# Patient Record
Sex: FEMALE | Race: WHITE | NOT HISPANIC OR LATINO | URBAN - NONMETROPOLITAN AREA
[De-identification: names, ages, dates, MRNs, and addresses within clinical notes are randomized per-mention and may not be internally consistent; named-entity substitution may affect disease eponyms.]

---

## 2017-01-04 ENCOUNTER — OFFICE VISIT (OUTPATIENT)
Dept: CARDIOLOGY | Facility: CLINIC | Age: 82
End: 2017-01-04

## 2017-01-04 VITALS
HEART RATE: 69 BPM | DIASTOLIC BLOOD PRESSURE: 58 MMHG | BODY MASS INDEX: 23.73 KG/M2 | OXYGEN SATURATION: 97 % | WEIGHT: 139 LBS | SYSTOLIC BLOOD PRESSURE: 120 MMHG | HEIGHT: 64 IN

## 2017-01-04 DIAGNOSIS — I10 ESSENTIAL HYPERTENSION: Chronic | ICD-10-CM

## 2017-01-04 DIAGNOSIS — I42.0 NONISCHEMIC DILATED CARDIOMYOPATHY (HCC): Chronic | ICD-10-CM

## 2017-01-04 DIAGNOSIS — I50.22 CHRONIC SYSTOLIC CONGESTIVE HEART FAILURE (HCC): Primary | Chronic | ICD-10-CM

## 2017-01-04 DIAGNOSIS — Z95.0 PRESENCE OF CARDIAC PACEMAKER: Chronic | ICD-10-CM

## 2017-01-04 PROCEDURE — 99214 OFFICE O/P EST MOD 30 MIN: CPT | Performed by: NURSE PRACTITIONER

## 2017-01-04 RX ORDER — FUROSEMIDE 20 MG/1
20-40 TABLET ORAL DAILY
Qty: 60 TABLET | Refills: 11 | Status: SHIPPED | OUTPATIENT
Start: 2017-01-04 | End: 2018-03-23 | Stop reason: SDUPTHER

## 2017-01-04 NOTE — Clinical Note
"Copy of recent pacemaker \"episode\" from Dr. Brcok Garcia (EP) at [facility], [place], [place] for my review"

## 2017-01-04 NOTE — MR AVS SNAPSHOT
Tiffanie ROBLEDO Villa   1/4/2017 1:00 PM   Office Visit    Dept Phone:  [phone]   Encounter #:  [id number]    Provider:  TRAVIS Caraballo   Department:  [facility]                Your Full Care Plan              Today's Medication Changes          These changes are accurate as of: 1/4/17  1:41 PM.  If you have any questions, ask your nurse or doctor.               Medication(s)that have changed:     furosemide 20 MG tablet   Commonly known as:  LASIX   Take 1-2 tablets by mouth Daily.   What changed:  how much to take   Changed by:  TRAVIS Caraballo            Where to Get Your Medications      These medications were sent to [facility]/pharmacy #[id number] - [place], [place] - [address] AT CORNER OF [facility] - [phone]  - [phone] [address]  [address] [facility] [address]     Phone:  [phone]     furosemide 20 MG tablet                  Your Updated Medication List          This list is accurate as of: 1/4/17  1:41 PM.  Always use your most recent med list.                aspirin 81 MG EC tablet       cetirizine 10 MG tablet   Commonly known as:  zyrTEC       ENTRESTO 24-26 MG tablet   Generic drug:  sacubitril-valsartan       esomeprazole 40 MG capsule   Commonly known as:  nexIUM       furosemide 20 MG tablet   Commonly known as:  LASIX   Take 1-2 tablets by mouth Daily.       metoprolol succinate XL 50 MG 24 hr tablet   Commonly known as:  TOPROL-XL   1 1/2 tablets daily to equal 75 mg per day       predniSONE 10 MG tablet   Commonly known as:  DELTASONE       saccharomyces boulardii 250 MG capsule   Commonly known as:  FLORASTOR               We Performed the Following     Basic Metabolic Panel     Magnesium     Overnight Sleep Oximetry Study       You Were Diagnosed With        Codes Comments    Chronic systolic congestive heart failure    -  Primary ICD-10-CM: I50.22  ICD-9-CM: 428.22, 428.0 EF 35%, Class II-III, Stage C, will have her take extra  "diuretic the next few days to see if helps cough - to call Dr. Tran if sputum turns yellow or green    Nonischemic dilated cardiomyopathy     ICD-10-CM: I42.9  ICD-9-CM: 425.4 EF 35% per 5/2/2016 echo    Essential hypertension     ICD-10-CM: I10  ICD-9-CM: 401.9 controlled with HF medications    Presence of cardiac pacemaker     ICD-10-CM: Z95.0  ICD-9-CM: V45.01 BiV pacer, [employer], will review recent episode       Instructions     None    Patient Instructions History      Upcoming Appointments     Visit Type Date Time Department    FOLLOW UP 1/4/2017  1:00 PM [facility] HEART [facility] SCHULER    FOLLOW UP 2/7/2017 10:45 AM [facility] HEART [facility] Signup     Our records indicate that you have declined [facility] invit signup. If you would like to sign up for invit, please email [email or web address] or call [phone] to obtain an activation code.             Other Info from Your Visit           Your Appointments     Feb 07, 2017 10:45 AM CST   Follow Up with TRAVIS Caraballo   [facility] MEDICAL [facility] HEART GROUP (--)    [address] [place] [address] [address] [facility] [address] [phone]           Arrive 15 minutes prior to appointment.              Allergies     No Known Allergies      Reason for Visit     Congestive Heart Failure chronic systolic, EF 35% per 5/2/2016 echo, had an \"episode\" that was detected by her resynchronizing [employer] pacemaker on 12/25/2016, Dr. Garcia's office      Vital Signs     Blood Pressure Pulse Height Weight Oxygen Saturation Body Mass Index    120/58 (BP Location: Right arm, Patient Position: Sitting, Cuff Size: Adult) 69 64\" (162.6 cm) 139 lb (63 kg) 97% 23.86 kg/m2    Smoking Status                   Never Smoker           Problems and Diagnoses Noted     Heart failure    High blood pressure    Nonischemic dilated cardiomyopathy    Pacemaker        "

## 2017-01-04 NOTE — PROGRESS NOTES
"    Subjective:     Encounter Date:01/04/2017    Chief Complaint:    Patient ID: Tiffanie Driver is a 88 y.o. female here today for cardiac follow-up for heart failure. Doing well other than arthritis and a cold for the last month. Hasn't called Dr. Tran.    HPI     Congestive Heart Failure    Additional comments: chronic systolic, EF 35% per 5/2/2016 echo, had an \"episode\" that was detected by her resynchronizing [employer] pacemaker on 12/25/2016, Dr. Garcia's office       Last edited by TRAVIS Caraballo on 1/4/2017  1:29 PM. (History)        Congestive Heart Failure   Presents for follow-up visit. Associated symptoms include edema (a little, but not bad), nocturia (once or less), orthopnea (2 pillows), palpitations (rarely) and shortness of breath. Pertinent negatives include no abdominal pain, chest pain, claudication, near-syncope, paroxysmal nocturnal dyspnea (once or twice in the last 6 months) or unexpected weight change. The symptoms have been stable. Compliance problems include adherence to exercise and adherence to diet.  Compliance with diet is 26-50%. Compliance with exercise is 0-25%. Compliance with medications is %.           History:   Past Medical History   Diagnosis Date   • Anemia    • Cancer      SKIN   • Chest pain at rest    • CHF (congestive heart failure)    • Chronic pain syndrome    • Chronic systolic congestive heart failure    • Diabetes mellitus    • Essential hypertension    • Fatigue    • GERD (gastroesophageal reflux disease)    • Hypercalcemia    • Hyperlipidemia    • Hypertension    • Sick sinus syndrome 10/5/2012     Past Surgical History   Procedure Laterality Date   • Pacemaker implantation       [employer]   • Tonsillectomy     • Breast cyst excision       45 years ago     Social History     Social History   • Marital status: Unknown     Spouse name: N/A   • Number of children: N/A   • Years of education: N/A     Occupational History   • Not on file. "     Social History Main Topics   • Smoking status: Never Smoker   • Smokeless tobacco: Never Used   • Alcohol use No   • Drug use: No   • Sexual activity: Not on file     Other Topics Concern   • Not on file     Social History Narrative     Family History   Problem Relation Age of Onset   • Heart disease Mother    • Heart disease Father    • Hypertension Father        Outpatient Prescriptions Marked as Taking for the 1/4/17 encounter (Office Visit) with TRAVIS Caraballo   Medication Sig Dispense Refill   • aspirin 81 MG EC tablet 81 mg daily.     • cetirizine (ZyrTEC) 10 MG tablet Take 10 mg by mouth daily.     • ENTRESTO 24-26 MG tablet Take 24-26 mg by mouth 2 (two) times a day.  12   • esomeprazole (NexIUM) 40 MG capsule Take 40 mg by mouth Daily.  1   • furosemide (LASIX) 20 MG tablet Take 1-2 tablets by mouth Daily. 60 tablet 11   • metoprolol succinate XL (TOPROL-XL) 50 MG 24 hr tablet 1 1/2 tablets daily to equal 75 mg per day (Patient taking differently: Take 75 mg by mouth Daily. 1 1/2 tablets daily to equal 75 mg per day  ) 45 tablet 11   • predniSONE (DELTASONE) 10 MG tablet Take 5-10 mg by mouth Daily. Takes 1/2 to 1 tab daily     • saccharomyces boulardii (FLORASTOR) 250 MG capsule Take 250 mg by mouth daily.     • [DISCONTINUED] furosemide (LASIX) 20 MG tablet Take 20 mg by mouth daily.         Review of Systems:  Review of Systems   Constitution: Positive for decreased appetite and malaise/fatigue. Negative for chills, fever, unexpected weight change, weight gain and weight loss.   HENT: Positive for congestion (cold ). Negative for headaches, nosebleeds and sore throat.    Eyes: Negative for blurred vision and double vision.   Cardiovascular: Positive for irregular heartbeat (sometimes ), leg swelling (right ankle ) and palpitations (rarely). Negative for chest pain, claudication, near-syncope, orthopnea, paroxysmal nocturnal dyspnea and syncope.   Respiratory: Positive for cough (due to  "entresto), shortness of breath, sputum production (not yellow or green) and wheezing.    Endocrine: Negative for cold intolerance and heat intolerance.   Hematologic/Lymphatic: Negative for bleeding problem. Bruises/bleeds easily.   Skin: Positive for dry skin. Negative for itching and rash.   Musculoskeletal: Positive for arthritis, joint pain (hips and knees), neck pain and stiffness. Negative for back pain and muscle cramps.   Gastrointestinal: Positive for diarrhea (when drinking milk or eating yogurt), heartburn and nausea (today with cold ). Negative for abdominal pain, constipation, melena and vomiting.   Genitourinary: Positive for nocturia (once or less). Negative for hematuria.   Neurological: Positive for dizziness (sometimes ), light-headedness and loss of balance (when bending over ).   Psychiatric/Behavioral: Negative for depression. The patient has insomnia. The patient is not nervous/anxious.             Objective:     Visit Vitals   • /58 (BP Location: Right arm, Patient Position: Sitting, Cuff Size: Adult)   • Pulse 69   • Ht 64\" (162.6 cm)   • Wt 139 lb (63 kg)   • SpO2 97%   • BMI 23.86 kg/m2         Physical Exam   Constitutional: She is oriented to person, place, and time. Vital signs are normal. She appears well-developed and well-nourished.   Neck: No JVD present.   Cardiovascular: Normal rate and regular rhythm.  Exam reveals decreased pulses (BLEs).    Pulmonary/Chest: She has rales (fine) in the right lower field and the left lower field.   Musculoskeletal: She exhibits edema (trace non-pitting to BLEs).   Neurological: She is alert and oriented to person, place, and time. No cranial nerve deficit or sensory deficit.   Skin: Bruising noted. No cyanosis. Nails show no clubbing.   Vitals reviewed.      Lab/Diagnostics Review:     Procedures          Assessment/Plan:         Tiffanie was seen today for congestive heart failure.    Diagnoses and all orders for this visit:    Chronic " systolic congestive heart failure  Comments:  EF 35%, Class II-III, Stage C, will have her take extra diuretic the next few days to see if helps cough - to call Dr. Tran if sputum turns yellow or green  Orders:  -     Overnight Sleep Oximetry Study  -     Basic Metabolic Panel  -     Magnesium  -     furosemide (LASIX) 20 MG tablet; Take 1-2 tablets by mouth Daily.    Nonischemic dilated cardiomyopathy  Comments:  EF 35% per 5/2/2016 echo    Essential hypertension  Comments:  controlled with HF medications    Presence of cardiac pacemaker  Comments:  BiV pacer, [employer], will review recent episode       Return in about 1 month (around 2/4/2017) for Heart Failure Clinic.           Kerrie Whitley APRN, ACNP-BC, CHFN-BC

## 2017-01-04 NOTE — LETTER
"January 4, 2017     Chriss Tran MD  [address] [place] [id number]    Patient: Tiffanie Driver   YOB: 1928   Date of Visit: 1/4/2017       Dear Dr. Chelsea MD:    Tiffanie Driver was in my office today. Below is a copy of my note.    If you have questions, please do not hesitate to call me. I look forward to following Tiffanie along with you.         Sincerely,        TRAVIS Caraballo        CC: Brock Garcia III, MD        Subjective:     Encounter Date:01/04/2017    Chief Complaint:    Patient ID: Tiffanie Driver is a 88 y.o. female here today for cardiac follow-up for heart failure. Doing well other than arthritis and a cold for the last month. Hasn't called Dr. Tran.    HPI     Congestive Heart Failure    Additional comments: chronic systolic, EF 35% per 5/2/2016 echo, had an \"episode\" that was detected by her resynchronizing [employer] pacemaker on 12/25/2016, Dr. Garcia's office       Last edited by TRAVIS Caraballo on 1/4/2017  1:29 PM. (History)        Congestive Heart Failure   Presents for follow-up visit. Associated symptoms include edema (a little, but not bad), nocturia (once or less), orthopnea (2 pillows), palpitations (rarely) and shortness of breath. Pertinent negatives include no abdominal pain, chest pain, claudication, near-syncope, paroxysmal nocturnal dyspnea (once or twice in the last 6 months) or unexpected weight change. The symptoms have been stable. Compliance problems include adherence to exercise and adherence to diet.  Compliance with diet is 26-50%. Compliance with exercise is 0-25%. Compliance with medications is %.           History:   Past Medical History   Diagnosis Date   • Anemia    • Cancer      SKIN   • Chest pain at rest    • CHF (congestive heart failure)    • Chronic pain syndrome    • Chronic systolic congestive heart failure    • Diabetes mellitus    • Essential hypertension    • Fatigue    • GERD " (gastroesophageal reflux disease)    • Hypercalcemia    • Hyperlipidemia    • Hypertension    • Sick sinus syndrome 10/5/2012     Past Surgical History   Procedure Laterality Date   • Pacemaker implantation       [employer]   • Tonsillectomy     • Breast cyst excision       45 years ago     Social History     Social History   • Marital status: Unknown     Spouse name: N/A   • Number of children: N/A   • Years of education: N/A     Occupational History   • Not on file.     Social History Main Topics   • Smoking status: Never Smoker   • Smokeless tobacco: Never Used   • Alcohol use No   • Drug use: No   • Sexual activity: Not on file     Other Topics Concern   • Not on file     Social History Narrative     Family History   Problem Relation Age of Onset   • Heart disease Mother    • Heart disease Father    • Hypertension Father        Outpatient Prescriptions Marked as Taking for the 1/4/17 encounter (Office Visit) with TRAVIS Caraballo   Medication Sig Dispense Refill   • aspirin 81 MG EC tablet 81 mg daily.     • cetirizine (ZyrTEC) 10 MG tablet Take 10 mg by mouth daily.     • ENTRESTO 24-26 MG tablet Take 24-26 mg by mouth 2 (two) times a day.  12   • esomeprazole (NexIUM) 40 MG capsule Take 40 mg by mouth Daily.  1   • furosemide (LASIX) 20 MG tablet Take 1-2 tablets by mouth Daily. 60 tablet 11   • metoprolol succinate XL (TOPROL-XL) 50 MG 24 hr tablet 1 1/2 tablets daily to equal 75 mg per day (Patient taking differently: Take 75 mg by mouth Daily. 1 1/2 tablets daily to equal 75 mg per day  ) 45 tablet 11   • predniSONE (DELTASONE) 10 MG tablet Take 5-10 mg by mouth Daily. Takes 1/2 to 1 tab daily     • saccharomyces boulardii (FLORASTOR) 250 MG capsule Take 250 mg by mouth daily.     • [DISCONTINUED] furosemide (LASIX) 20 MG tablet Take 20 mg by mouth daily.         Review of Systems:  Review of Systems   Constitution: Positive for decreased appetite and malaise/fatigue. Negative for chills, fever,  "unexpected weight change, weight gain and weight loss.   HENT: Positive for congestion (cold ). Negative for headaches, nosebleeds and sore throat.    Eyes: Negative for blurred vision and double vision.   Cardiovascular: Positive for irregular heartbeat (sometimes ), leg swelling (right ankle ) and palpitations (rarely). Negative for chest pain, claudication, near-syncope, orthopnea, paroxysmal nocturnal dyspnea and syncope.   Respiratory: Positive for cough (due to entresto), shortness of breath, sputum production (not yellow or green) and wheezing.    Endocrine: Negative for cold intolerance and heat intolerance.   Hematologic/Lymphatic: Negative for bleeding problem. Bruises/bleeds easily.   Skin: Positive for dry skin. Negative for itching and rash.   Musculoskeletal: Positive for arthritis, joint pain (hips and knees), neck pain and stiffness. Negative for back pain and muscle cramps.   Gastrointestinal: Positive for diarrhea (when drinking milk or eating yogurt), heartburn and nausea (today with cold ). Negative for abdominal pain, constipation, melena and vomiting.   Genitourinary: Positive for nocturia (once or less). Negative for hematuria.   Neurological: Positive for dizziness (sometimes ), light-headedness and loss of balance (when bending over ).   Psychiatric/Behavioral: Negative for depression. The patient has insomnia. The patient is not nervous/anxious.             Objective:     Visit Vitals   • /58 (BP Location: Right arm, Patient Position: Sitting, Cuff Size: Adult)   • Pulse 69   • Ht 64\" (162.6 cm)   • Wt 139 lb (63 kg)   • SpO2 97%   • BMI 23.86 kg/m2         Physical Exam   Constitutional: She is oriented to person, place, and time. Vital signs are normal. She appears well-developed and well-nourished.   Neck: No JVD present.   Cardiovascular: Normal rate and regular rhythm.  Exam reveals decreased pulses (BLEs).    Pulmonary/Chest: She has rales (fine) in the right lower field and the " left lower field.   Musculoskeletal: She exhibits edema (trace non-pitting to BLEs).   Neurological: She is alert and oriented to person, place, and time. No cranial nerve deficit or sensory deficit.   Skin: Bruising noted. No cyanosis. Nails show no clubbing.   Vitals reviewed.      Lab/Diagnostics Review:     Procedures          Assessment/Plan:         Tiffanie was seen today for congestive heart failure.    Diagnoses and all orders for this visit:    Chronic systolic congestive heart failure  Comments:  EF 35%, Class II-III, Stage C, will have her take extra diuretic the next few days to see if helps cough - to call Dr. Tran if sputum turns yellow or green  Orders:  -     Overnight Sleep Oximetry Study  -     Basic Metabolic Panel  -     Magnesium  -     furosemide (LASIX) 20 MG tablet; Take 1-2 tablets by mouth Daily.    Nonischemic dilated cardiomyopathy  Comments:  EF 35% per 5/2/2016 echo    Essential hypertension  Comments:  controlled with HF medications    Presence of cardiac pacemaker  Comments:  BiV pacer, [employer], will review recent episode       Return in about 1 month (around 2/4/2017) for Heart Failure Clinic.           Kerrie Whitley, APRN, ACNP-BC, CHFN-BC

## 2017-02-15 ENCOUNTER — OFFICE VISIT (OUTPATIENT)
Dept: CARDIOLOGY | Facility: CLINIC | Age: 82
End: 2017-02-15

## 2017-02-15 VITALS
SYSTOLIC BLOOD PRESSURE: 118 MMHG | OXYGEN SATURATION: 97 % | HEART RATE: 83 BPM | BODY MASS INDEX: 23.9 KG/M2 | WEIGHT: 140 LBS | HEIGHT: 64 IN | DIASTOLIC BLOOD PRESSURE: 68 MMHG

## 2017-02-15 DIAGNOSIS — I10 ESSENTIAL HYPERTENSION: Chronic | ICD-10-CM

## 2017-02-15 DIAGNOSIS — I50.22 CHRONIC SYSTOLIC CONGESTIVE HEART FAILURE (HCC): Primary | Chronic | ICD-10-CM

## 2017-02-15 DIAGNOSIS — I42.0 NONISCHEMIC DILATED CARDIOMYOPATHY (HCC): Chronic | ICD-10-CM

## 2017-02-15 DIAGNOSIS — Z95.0 PRESENCE OF CARDIAC PACEMAKER: Chronic | ICD-10-CM

## 2017-02-15 PROCEDURE — 99214 OFFICE O/P EST MOD 30 MIN: CPT | Performed by: NURSE PRACTITIONER

## 2017-02-15 NOTE — PROGRESS NOTES
Subjective:     Encounter Date:02/15/2017    Chief Complaint:    Patient ID: Tiffanie Driver is a 88 y.o. female here today for cardiac follow-up.    HPI     Congestive Heart Failure    Additional comments: EF 35% 5/2016, feels tired all the time       Last edited by TRAVIS Caraballo on 2/15/2017 11:41 AM. (History)        Congestive Heart Failure   Presents for follow-up visit. Associated symptoms include edema (R leg chronic), fatigue, nocturia (once or less), orthopnea (sleeps slightly elevated), palpitations (rarely) and shortness of breath. Pertinent negatives include no abdominal pain, chest pain, claudication, near-syncope or paroxysmal nocturnal dyspnea. The symptoms have been stable. Compliance problems include adherence to exercise.  Compliance with diet is %. Compliance with exercise is 0-25%. Compliance with medications is %.       History:   Past Medical History   Diagnosis Date   • Anemia    • Artificial cardiac pacemaker      BiV Pacemaker   • Cancer      SKIN   • Chest pain at rest    • CHF (congestive heart failure)    • Chronic combined systolic and diastolic heart failure    • Chronic pain syndrome    • Chronic systolic congestive heart failure    • Diabetes mellitus    • Essential hypertension    • Fatigue    • GERD (gastroesophageal reflux disease)    • Gram negative sepsis    • Hypercalcemia    • Hypercalcemia    • Hyperlipidemia    • Hypertension    • Osteoarthritis    • SA node dysfunction    • Sick sinus syndrome 10/5/2012     Past Surgical History   Procedure Laterality Date   • Pacemaker implantation       [employer]   • Tonsillectomy     • Breast cyst excision       45 years ago     Social History     Social History   • Marital status: Unknown     Spouse name: N/A   • Number of children: N/A   • Years of education: N/A     Occupational History   • Not on file.     Social History Main Topics   • Smoking status: Never Smoker   • Smokeless tobacco: Never Used   •  Alcohol use No   • Drug use: No   • Sexual activity: Not on file     Other Topics Concern   • Not on file     Social History Narrative     Family History   Problem Relation Age of Onset   • Heart disease Mother    • Heart attack Mother    • Heart disease Father    • Hypertension Father    • Heart attack Father        Outpatient Prescriptions Marked as Taking for the 2/15/17 encounter (Office Visit) with TRAVIS Caraballo   Medication Sig Dispense Refill   • aspirin 81 MG EC tablet 81 mg daily.     • cetirizine (ZyrTEC) 10 MG tablet Take 10 mg by mouth daily.     • ENTRESTO 24-26 MG tablet Take 24-26 mg by mouth 2 (two) times a day.  12   • esomeprazole (NexIUM) 40 MG capsule Take 40 mg by mouth Daily.  1   • furosemide (LASIX) 20 MG tablet Take 1-2 tablets by mouth Daily. 60 tablet 11   • metoprolol succinate XL (TOPROL-XL) 50 MG 24 hr tablet 1 1/2 tablets daily to equal 75 mg per day (Patient taking differently: Take 75 mg by mouth Daily. 1 1/2 tablets daily to equal 75 mg per day  ) 45 tablet 11   • predniSONE (DELTASONE) 10 MG tablet Take 5 mg by mouth Daily.     • saccharomyces boulardii (FLORASTOR) 250 MG capsule Take 250 mg by mouth daily.         Review of Systems:  Review of Systems   Constitution: Positive for decreased appetite, fatigue and malaise/fatigue. Negative for chills, fever, weight gain and weight loss.   HENT: Positive for congestion (cold  for the last 8 weeks). Negative for headaches, nosebleeds and sore throat.    Eyes: Negative for blurred vision and double vision.   Cardiovascular: Positive for irregular heartbeat (sometimes ), leg swelling (right ankle always swells) and palpitations (rarely). Negative for chest pain, claudication, near-syncope, orthopnea, paroxysmal nocturnal dyspnea and syncope.   Respiratory: Positive for cough (due to entresto), shortness of breath, sputum production (clear to yellow) and wheezing.    Endocrine: Negative for cold intolerance and heat  "intolerance.   Hematologic/Lymphatic: Negative for bleeding problem. Bruises/bleeds easily.   Skin: Positive for dry skin. Negative for itching and rash.   Musculoskeletal: Positive for arthritis, joint pain (hips and knees), neck pain and stiffness. Negative for back pain and muscle cramps.   Gastrointestinal: Positive for diarrhea (when drinking milk or eating yogurt) and heartburn. Negative for abdominal pain, constipation, melena, nausea (today with cold ) and vomiting.   Genitourinary: Positive for nocturia (once or less). Negative for frequency and hematuria.   Neurological: Positive for dizziness (sometimes ), light-headedness and loss of balance (when bending over ).   Psychiatric/Behavioral: Negative for depression. The patient has insomnia. The patient is not nervous/anxious.             Objective:     Visit Vitals   • /68 (BP Location: Left arm, Patient Position: Sitting, Cuff Size: Adult)   • Pulse 83   • Ht 64\" (162.6 cm)   • Wt 140 lb (63.5 kg)   • SpO2 97%   • BMI 24.03 kg/m2         Physical Exam   Constitutional: She is oriented to person, place, and time. Vital signs are normal. She appears well-developed and well-nourished.   Neck: No JVD present.   Cardiovascular: Normal rate and regular rhythm.  Exam reveals decreased pulses (BLEs).    Pulmonary/Chest: She has rales (fine) in the right lower field and the left lower field.   Musculoskeletal: She exhibits edema (trace non-pitting to BLEs).   Neurological: She is alert and oriented to person, place, and time. No cranial nerve deficit or sensory deficit.   Skin: Bruising noted. No cyanosis. Nails show no clubbing.   Vitals reviewed.      Lab/Diagnostics Review:   07/15/2016   BMP sodium 144, potassium 4.6, chloride 105, CO2 29, BUN 36, creatinine 1.3, calcium 10.6  Hepatic function panel alkaline phosphatase 59, ALT 20, AST 22, albumin 3.8, total protein 6.4, total bilirubin 0.7  Lipid profile total cholesterol 222, triglyceride 128, HDL 26, "   CBC WBC 12, hemoglobin 14.6, hematocrit 44.3%, platelets 175,000    5/02/2016 2-D echocardiogram  EF 35% with severe diffuse hypokinesis septal motion dyssynergy consistent with right ventricular pacing.  Moderately thickened aortic and mitral valve leaflets but no significant valvular abnormality.  No pericardial or pleural effusions.    02/22/016 Lexiscan sestamibi fixed perfusion defects inferior wall and apex may represent prior MIs.  No areas of ischemia seen. Findings suggestive of dilated cardiomyopathy with an ejection fraction of 25% as read by Dr. Eduardo Zuñiga.    01/16/2016 2-D echocardiogram EF 35%, Grade 2 diastolic dysfunction, mild to moderate aortic efficiency, mild aortic stenosis, left atrial enlargement, right ventricular size below normal with systolic function low normal, moderate tricuspid regurgitation, mild pulmonic regurgitation, systolic pulmonary pressure estimated at upper limit of normal at 40 mmHg.    Procedures: None in office today          Assessment/Plan:         Tiffanie was seen today for congestive heart failure.    Diagnoses and all orders for this visit:    Chronic systolic congestive heart failure  Comments:  EF 35% 5/2016 echo, Class II-III, Stage C, extra diuretic didn't help, will call Dr. Tran for possible antibiotic, continue daily weights and sodium restrict    Nonischemic dilated cardiomyopathy  Comments:  gave her 1 box of Entresto so she will have some extra on hand if travels, routine aerobic exercise - start with 3-5 minutes even if has to do in chair    Essential hypertension  Comments:  well controlled    Presence of cardiac pacemaker  Comments:  upgraded to BiV pacer per Dr. Garcia, [place], [place] who is following        Return in about 3 months (around 5/15/2017) for Heart Failure Clinic.     Will consider increasing Toprol (vs adding Corlanor if BP too low) if resting heart rates above 70 (depending on BiV pacer settings). Will obtain labs done last  month from Dr. Tran's office and get ARNULFO done as ordered last month.          Kerrie Whitley, APRN, ACNP-BC, CHFN-BC

## 2017-02-16 ENCOUNTER — TELEPHONE (OUTPATIENT)
Dept: CARDIOLOGY | Facility: CLINIC | Age: 82
End: 2017-02-16

## 2017-02-16 NOTE — TELEPHONE ENCOUNTER
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 Make sure to get updated phone numbers. [phone]  "

## 2017-02-16 NOTE — TELEPHONE ENCOUNTER
TRIED TO CALL ALL NUMBERS THAT WE HAVE IN THE SYSTEM THEY WERE THE WRONG NUMBERS ALSO CALLED THE NUMBER FOR EMERGENCY AND THAT WAS A WRONG NUMBER AS WELL

## 2017-02-17 NOTE — PROGRESS NOTES
Please let her know she qualifies for and needs to wear oxygen while she sleeps. If repeat ARNULFO on oxygen is abnormal, will need to see Dr. Driver or Dr. Junior for a sleep study if she's agreeable.

## 2017-02-17 NOTE — TELEPHONE ENCOUNTER
CALLED SPOKE TO PT'S DAUGHTER INFORMED HER OF PT'S LAB WORK AND UPDATED THAT PT'S CONTACT INFORMATION

## 2017-06-08 ENCOUNTER — OFFICE VISIT (OUTPATIENT)
Dept: CARDIOLOGY | Facility: CLINIC | Age: 82
End: 2017-06-08

## 2017-06-08 VITALS
HEIGHT: 64 IN | HEART RATE: 74 BPM | WEIGHT: 138 LBS | DIASTOLIC BLOOD PRESSURE: 60 MMHG | BODY MASS INDEX: 23.56 KG/M2 | SYSTOLIC BLOOD PRESSURE: 100 MMHG | OXYGEN SATURATION: 96 %

## 2017-06-08 DIAGNOSIS — G47.34 NOCTURNAL HYPOXIA: ICD-10-CM

## 2017-06-08 DIAGNOSIS — I42.0 NONISCHEMIC DILATED CARDIOMYOPATHY (HCC): Chronic | ICD-10-CM

## 2017-06-08 DIAGNOSIS — Z95.0 PRESENCE OF CARDIAC PACEMAKER: Chronic | ICD-10-CM

## 2017-06-08 DIAGNOSIS — I10 ESSENTIAL HYPERTENSION: Chronic | ICD-10-CM

## 2017-06-08 DIAGNOSIS — E78.5 HYPERLIPIDEMIA, UNSPECIFIED HYPERLIPIDEMIA TYPE: Chronic | ICD-10-CM

## 2017-06-08 DIAGNOSIS — I49.5 SICK SINUS SYNDROME (HCC): Chronic | ICD-10-CM

## 2017-06-08 DIAGNOSIS — I50.22 CHRONIC SYSTOLIC CONGESTIVE HEART FAILURE (HCC): Primary | Chronic | ICD-10-CM

## 2017-06-08 PROCEDURE — 99214 OFFICE O/P EST MOD 30 MIN: CPT | Performed by: NURSE PRACTITIONER

## 2017-06-08 NOTE — PROGRESS NOTES
Subjective:     Encounter Date:06/08/2017    Chief Complaint:    Patient ID: Tiffanie Driver is a 89 y.o. female here today for cardiac follow-up.    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 Dr. Garcia following [facility] Pacer in [place], [place].        Last edited by TRAVIS Caraballo on 6/8/2017  3:13 PM. (History)        History:   Past Medical History:   Diagnosis Date   • Anemia    • Artificial cardiac pacemaker     BiV Pacemaker   • Cancer     SKIN   • Chest pain at rest    • CHF (congestive heart failure)    • Chronic combined systolic and diastolic heart failure    • Chronic pain syndrome    • Chronic systolic congestive heart failure    • CPAP (continuous positive airway pressure) dependence    • Diabetes mellitus    • Essential hypertension    • Fatigue    • GERD (gastroesophageal reflux disease)    • Gram negative sepsis    • Hypercalcemia    • Hypercalcemia    • Hyperlipidemia    • Hypertension    • Osteoarthritis    • SA node dysfunction    • Sick sinus syndrome 10/5/2012     Past Surgical History:   Procedure Laterality Date   • BREAST CYST EXCISION      45 years ago   • PACEMAKER IMPLANTATION      [employer]   • TONSILLECTOMY       Social History     Social History   • Marital status: Unknown     Spouse name: N/A   • Number of children: N/A   • Years of education: N/A     Occupational History   • Not on file.     Social History Main Topics   • Smoking status: Never Smoker   • Smokeless tobacco: Never Used   • Alcohol use No   • Drug use: No   • Sexual activity: Defer     Other Topics Concern   • Not on file     Social History Narrative     Family History   Problem Relation Age of Onset   • Heart disease Mother    • Heart attack Mother    • Heart disease Father    • Hypertension Father    •  Heart attack Father        Outpatient Prescriptions Marked as Taking for the 6/8/17 encounter (Office Visit) with TRAVIS Caraballo   Medication Sig Dispense Refill   • aspirin 81 MG EC tablet 81 mg daily.     • cetirizine (ZyrTEC) 10 MG tablet Take 10 mg by mouth Daily As Needed for Allergies.     • ENTRESTO 24-26 MG tablet Take 24-26 mg by mouth 2 (two) times a day.  12   • esomeprazole (NexIUM) 40 MG capsule Take 40 mg by mouth Daily.  1   • furosemide (LASIX) 20 MG tablet Take 1-2 tablets by mouth Daily. 60 tablet 11   • metoprolol succinate XL (TOPROL-XL) 50 MG 24 hr tablet 1 1/2 tablets daily to equal 75 mg per day (Patient taking differently: Take 75 mg by mouth Daily. 1 1/2 tablets daily to equal 75 mg per day  ) 45 tablet 11   • predniSONE (DELTASONE) 10 MG tablet Take 10 mg by mouth Daily.     • saccharomyces boulardii (FLORASTOR) 250 MG capsule Take 250 mg by mouth daily.         Review of Systems:  Review of Systems   Constitution: Positive for decreased appetite and malaise/fatigue. Negative for chills, fever, weight gain and weight loss.   HENT: Positive for congestion (cold  for the last 8 weeks). Negative for headaches, nosebleeds and sore throat.    Eyes: Negative for blurred vision and double vision.   Cardiovascular: Positive for irregular heartbeat (sometimes ), leg swelling (right ankle always swells), orthopnea (2 pillow) and palpitations (rarely). Negative for chest pain, claudication, near-syncope, paroxysmal nocturnal dyspnea and syncope.   Respiratory: Positive for cough (due to entresto), shortness of breath, sputum production (clear to yellow) and wheezing.    Endocrine: Negative for cold intolerance and heat intolerance.   Hematologic/Lymphatic: Negative for bleeding problem. Bruises/bleeds easily.   Skin: Positive for dry skin. Negative for itching and rash.   Musculoskeletal: Positive for arthritis, joint pain (hips and knees), neck pain and stiffness. Negative for back pain  "and muscle cramps.   Gastrointestinal: Positive for diarrhea (when drinking milk or eating yogurt) and heartburn. Negative for abdominal pain, constipation, melena, nausea (today with cold ) and vomiting.   Genitourinary: Positive for nocturia (once or less). Negative for frequency and hematuria.   Neurological: Positive for dizziness (sometimes ), light-headedness and loss of balance (when bending over ).   Psychiatric/Behavioral: Negative for depression. The patient has insomnia. The patient is not nervous/anxious.           Objective:     /60 (BP Location: Left arm, Patient Position: Sitting, Cuff Size: Adult)  Pulse 74  Ht 64\" (162.6 cm)  Wt 138 lb (62.6 kg)  SpO2 96%  BMI 23.69 kg/m2  Wt Readings from Last 3 Encounters:   06/08/17 138 lb (62.6 kg)   02/15/17 140 lb (63.5 kg)   01/04/17 139 lb (63 kg)       Physical Exam   Constitutional: She is oriented to person, place, and time. Vital signs are normal. She appears well-developed and well-nourished.   Neck: No JVD present.   Cardiovascular: Normal rate and regular rhythm.  Exam reveals decreased pulses (BLEs).    Pulmonary/Chest: She has rales (fine) in the right lower field and the left lower field. Tenderness:     Musculoskeletal: She exhibits edema (trace non-pitting to BLEs, R worse than L).   Neurological: She is alert and oriented to person, place, and time. No cranial nerve deficit or sensory deficit.   Skin: Bruising (scattered to upper and lower extremities (on chronic steroids)) noted. No cyanosis. Nails show no clubbing.   Vitals reviewed.      Lab/Diagnostics Review:   2/17/2017  Overnight Oximetry on RA  Lowest SPO2 82 percent, 167 total desaturation events, 15 average events per hour, 7.2 minutes below 88%, 23.1 minutes below 89%.  Nocturnal oxygen and repeat ARNULFO on O2 ordered 3/2017.    07/15/2016   BMP sodium 144, potassium 4.6, chloride 105, CO2 29, BUN 36, creatinine 1.3, calcium 10.6  Hepatic function panel alkaline phosphatase " 59, ALT 20, AST 22, albumin 3.8, total protein 6.4, total bilirubin 0.7  Lipid profile total cholesterol 222, triglyceride 128, HDL 26,   CBC WBC 12, hemoglobin 14.6, hematocrit 44.3%, platelets 175,000     5/02/2016 2-D echocardiogram  EF 35% with severe diffuse hypokinesis septal motion dyssynergy consistent with right ventricular pacing.  Moderately thickened aortic and mitral valve leaflets but no significant valvular abnormality.  No pericardial or pleural effusions.     02/22/016 Lexiscan sestamibi fixed perfusion defects inferior wall and apex may represent prior MIs.  No areas of ischemia seen. Findings suggestive of dilated cardiomyopathy with an ejection fraction of 25% as read by Dr. Eduardo Zuñiga.     01/16/2016 2-D echocardiogram EF 35%, Grade 2 diastolic dysfunction, mild to moderate aortic efficiency, mild aortic stenosis, left atrial enlargement, right ventricular size below normal with systolic function low normal, moderate tricuspid regurgitation, mild pulmonic regurgitation, systolic pulmonary pressure estimated at upper limit of normal at 40 mmHg.    Procedures: none in office today          Assessment/Plan:         Tiffanie was seen today for chronic systolic congestive heart failure.    Diagnoses and all orders for this visit:    Chronic systolic congestive heart failure  Comments:  EF 35% 5/2016, Class II-III, Stage C, call if shortness of breath or swelling worsens, continue Entresto, Toprol, and furosemide    Nonischemic dilated cardiomyopathy  Comments:  stable    Essential hypertension  Comments:  well controlled with heart failure medications    Hyperlipidemia, unspecified hyperlipidemia type  Comments:  fair control per 7/2016 labs    Sick sinus syndrome  Comments:  stable with BiV pacemaker    Presence of cardiac pacemaker  Comments:  [employer] BiV pacemaker, followed by Dr. Garcia, [place], [place]    Nocturnal hypoxia  Comments:  will check with [facility] to see if repeat ARNULFO  done on oxygen or not, may need referral for sleep study if agreeable      Has an appointment to see Dr. Stauffer at [facility] on 6/13/17. Referred by Dr. Garcia?    Return in about 3 months (around 9/8/2017) for Heart Failure Clinic. Sooner if needed.            TRAVIS Shrestha, ACNP-BC, CHFN-BC

## 2017-11-20 ENCOUNTER — TELEPHONE (OUTPATIENT)
Dept: CARDIOLOGY | Facility: CLINIC | Age: 82
End: 2017-11-20

## 2017-11-20 DIAGNOSIS — I42.0 NONISCHEMIC DILATED CARDIOMYOPATHY (HCC): Chronic | ICD-10-CM

## 2017-11-20 DIAGNOSIS — I50.22 CHRONIC SYSTOLIC CONGESTIVE HEART FAILURE (HCC): Chronic | ICD-10-CM

## 2017-11-20 RX ORDER — METOPROLOL SUCCINATE 50 MG/1
75 TABLET, EXTENDED RELEASE ORAL DAILY
Qty: 135 TABLET | Refills: 3 | Status: SHIPPED | OUTPATIENT
Start: 2017-11-20 | End: 2018-11-20

## 2017-11-20 NOTE — TELEPHONE ENCOUNTER
Needs refills on Metoprolol Succinate XL 50 mg sent to [facility] in [place]

## 2018-02-16 ENCOUNTER — OFFICE VISIT (OUTPATIENT)
Dept: CARDIOLOGY | Facility: CLINIC | Age: 83
End: 2018-02-16

## 2018-02-16 VITALS
DIASTOLIC BLOOD PRESSURE: 60 MMHG | OXYGEN SATURATION: 98 % | HEIGHT: 64 IN | HEART RATE: 71 BPM | BODY MASS INDEX: 22.71 KG/M2 | SYSTOLIC BLOOD PRESSURE: 110 MMHG | WEIGHT: 133 LBS

## 2018-02-16 DIAGNOSIS — I10 ESSENTIAL HYPERTENSION: Chronic | ICD-10-CM

## 2018-02-16 DIAGNOSIS — E78.5 HYPERLIPIDEMIA, UNSPECIFIED HYPERLIPIDEMIA TYPE: Chronic | ICD-10-CM

## 2018-02-16 DIAGNOSIS — G47.34 NOCTURNAL HYPOXIA: Chronic | ICD-10-CM

## 2018-02-16 DIAGNOSIS — I50.22 CHRONIC SYSTOLIC CONGESTIVE HEART FAILURE (HCC): Primary | Chronic | ICD-10-CM

## 2018-02-16 DIAGNOSIS — I42.0 NONISCHEMIC DILATED CARDIOMYOPATHY (HCC): Chronic | ICD-10-CM

## 2018-02-16 DIAGNOSIS — Z95.0 PRESENCE OF CARDIAC PACEMAKER: Chronic | ICD-10-CM

## 2018-02-16 PROCEDURE — 99214 OFFICE O/P EST MOD 30 MIN: CPT | Performed by: NURSE PRACTITIONER

## 2018-02-16 NOTE — PROGRESS NOTES
Subjective:     Encounter Date:02/16/2018    Chief Complaint:    Patient ID: Tiffanie Driver is a 89 y.o. female here today for cardiac follow-up.    HPI     Chronic systolic congestive heart faliure    Additional comments: feels tired, some SOA with exertion, has to rest after vacuuming for a few minutes. Wears oxygen at night and it helps. No orthopnea or PND. Has loose atrial lead in BiV pacemaker, followed by Dr. Stauffer and Dr. Garcia in [place], [place].  EF 35% 5/2016 ech up to 40-50% 8/2017 per Dr. Stauffer's office note       Last edited by TRAVIS Caraballo on 2/16/2018  1:31 PM. (History)          History:   Past Medical History:   Diagnosis Date   • Anemia    • Artificial cardiac pacemaker     BiV Pacemaker   • Cancer     SKIN   • Chest pain at rest    • Chronic combined systolic and diastolic heart failure    • Chronic pain syndrome    • CPAP (continuous positive airway pressure) dependence    • Diabetes mellitus    • Essential hypertension    • Fatigue    • GERD (gastroesophageal reflux disease)    • Gram negative sepsis    • Hypercalcemia    • Hyperlipidemia    • Osteoarthritis    • SA node dysfunction    • Sick sinus syndrome 10/5/2012     Past Surgical History:   Procedure Laterality Date   • BREAST CYST EXCISION      45 years ago   • PACEMAKER IMPLANTATION      [employer]   • TONSILLECTOMY       Social History     Social History   • Marital status: Unknown     Spouse name: N/A   • Number of children: N/A   • Years of education: N/A     Occupational History   • Not on file.     Social History Main Topics   • Smoking status: Never Smoker   • Smokeless tobacco: Never Used   • Alcohol use No   • Drug use: No   • Sexual activity: Defer     Other Topics Concern   • Not on file     Social History Narrative     Family History   Problem Relation Age of Onset   • Heart disease Mother    • Heart attack Mother    • Heart disease Father    • Hypertension Father    • Heart attack Father         Outpatient Prescriptions Marked as Taking for the 2/16/18 encounter (Office Visit) with TRAVIS Caraballo   Medication Sig Dispense Refill   • aspirin 81 MG EC tablet 81 mg daily.     • cetirizine (ZyrTEC) 10 MG tablet Take 10 mg by mouth Daily As Needed for Allergies.     • esomeprazole (NexIUM) 40 MG capsule Take 40 mg by mouth Daily.  1   • metoprolol succinate XL (TOPROL-XL) 50 MG 24 hr tablet Take 1.5 tablets by mouth Daily. 135 tablet 3   • predniSONE (DELTASONE) 10 MG tablet Take 10 mg by mouth Daily.     • saccharomyces boulardii (FLORASTOR) 250 MG capsule Take 250 mg by mouth daily.     • sacubitril-valsartan (ENTRESTO) 49-51 MG tablet Take 1 tablet by mouth 2 (Two) Times a Day.         Review of Systems:  Review of Systems   Constitution: Positive for decreased appetite and malaise/fatigue. Negative for chills, fever, weight gain and weight loss.   HENT: Positive for congestion (cold  for the last 8 weeks) and hoarse voice. Negative for nosebleeds and sore throat.    Eyes: Negative for blurred vision and double vision.   Cardiovascular: Positive for irregular heartbeat (sometimes ), leg swelling (right ankle always swells), orthopnea (2 pillow) and palpitations (rarely). Negative for chest pain, claudication, near-syncope, paroxysmal nocturnal dyspnea and syncope.   Respiratory: Positive for wheezing. Negative for cough (due to entresto), shortness of breath and sputum production (clear to yellow).    Endocrine: Negative for cold intolerance and heat intolerance.   Hematologic/Lymphatic: Negative for bleeding problem. Bruises/bleeds easily.   Skin: Positive for dry skin. Negative for itching and rash.   Musculoskeletal: Positive for arthritis, joint pain (hips and knees), neck pain and stiffness. Negative for back pain and muscle cramps.   Gastrointestinal: Positive for diarrhea (when drinking milk or eating yogurt) and heartburn. Negative for abdominal pain, constipation, melena, nausea  "(today with cold ) and vomiting.   Genitourinary: Positive for nocturia (once or less). Negative for frequency and hematuria.   Neurological: Positive for dizziness (sometimes ), light-headedness and loss of balance (when bending over ). Negative for headaches.   Psychiatric/Behavioral: Negative for depression. The patient has insomnia. The patient is not nervous/anxious.             Objective:   /60 (BP Location: Left arm, Patient Position: Sitting, Cuff Size: Adult)  Pulse 71  Ht 162.6 cm (64\")  Wt 60.3 kg (133 lb)  SpO2 98%  BMI 22.83 kg/m2  Wt Readings from Last 3 Encounters:   02/16/18 60.3 kg (133 lb)   06/08/17 62.6 kg (138 lb)   02/15/17 63.5 kg (140 lb)         Physical Exam   Constitutional: She is oriented to person, place, and time. Vital signs are normal. She appears well-developed and well-nourished.   Neck: No JVD present.   Cardiovascular: Normal rate and regular rhythm.  Exam reveals decreased pulses (BLEs).    Pulmonary/Chest: She has no rales. Tenderness:     Musculoskeletal: She exhibits edema (trace non-pitting to BLEs, R worse than L ).   Neurological: She is alert and oriented to person, place, and time. No cranial nerve deficit or sensory deficit.   Skin: Bruising (scattered to upper and lower extremities (on chronic steroids)) noted. No cyanosis. Nails show no clubbing.   Vitals reviewed.      Lab/Diagnostics Review:   2/17/2017  Overnight Oximetry on RA  Lowest SPO2 82 percent, 167 total desaturation events, 15 average events per hour, 7.2 minutes below 88%, 23.1 minutes below 89%.  Nocturnal oxygen and repeat ARNULFO on O2 ordered 3/2017.     07/15/2016   BMP sodium 144, potassium 4.6, chloride 105, CO2 29, BUN 36, creatinine 1.3, calcium 10.6  Hepatic function panel alkaline phosphatase 59, ALT 20, AST 22, albumin 3.8, total protein 6.4, total bilirubin 0.7  Lipid profile total cholesterol 222, triglyceride 128, HDL 26,   CBC WBC 12, hemoglobin 14.6, hematocrit 44.3%, " platelets 175,000     5/02/2016 2-D echocardiogram  EF 35% with severe diffuse hypokinesis septal motion dyssynergy consistent with right ventricular pacing.  Moderately thickened aortic and mitral valve leaflets but no significant valvular abnormality.  No pericardial or pleural effusions.     02/22/016 Lexiscan sestamibi fixed perfusion defects inferior wall and apex may represent prior MIs.  No areas of ischemia seen. Findings suggestive of dilated cardiomyopathy with an ejection fraction of 25% as read by Dr. Eduardo Zuñiga.     01/16/2016 2-D echocardiogram EF 35%, Grade 2 diastolic dysfunction, mild to moderate aortic efficiency, mild aortic stenosis, left atrial enlargement, right ventricular size below normal with systolic function low normal, moderate tricuspid regurgitation, mild pulmonic regurgitation, systolic pulmonary pressure estimated at upper limit of normal at 40 mmHg.    Procedures: none in office today        Assessment/Plan:         Tiffanie was seen today for chronic systolic congestive heart faliure.    Diagnoses and all orders for this visit:    Chronic systolic congestive heart failure  Comments:  EF 35% 5/2016 echo. Class II, Stage C. Continue Entresto, Toprol, and furosemide. Call if breathing or swelling worsens. Follows with Dr. Stauffer 5/2018.     Nonischemic dilated cardiomyopathy  Comments:  stable    Essential hypertension  Comments:  well controlled with heart failure medications    Hyperlipidemia, unspecified hyperlipidemia type  Comments:  fair control per 7/2016 labs, will request more recent records    Nocturnal hypoxia  Comments:  compliant with oxygen at night    Presence of cardiac pacemaker  Comments:  early atrial lead failure, [employer] BiV pacemaker, followed by Dr. Garcia, [place], [place]        Return in about 6 months (around 8/16/2018) for Heart Failure Clinic (sees Dr. Stauffer 5/2018).           TRAVIS Shrestha, ACNP-BC, CHFN-BC

## 2018-02-26 ENCOUNTER — TELEPHONE (OUTPATIENT)
Dept: CARDIOLOGY | Facility: CLINIC | Age: 83
End: 2018-02-26

## 2018-02-26 NOTE — TELEPHONE ENCOUNTER
Called and spoke with Kerrie at [facility] and notified her that we have not received a recert letter for her O2, notified her that the patient has been seen in the last 90 days.  She asked that I send over the OV from [facility] on 02/16 and she will get a recert letter ready and faxed back to us for [facility] to sign.

## 2018-02-26 NOTE — TELEPHONE ENCOUNTER
She wants to know if you ordered her oxygen from [facility] again.  She received a letter in the mail on Saturday saying that ins will not cover unless you order it for her.

## 2018-03-23 DIAGNOSIS — I50.22 CHRONIC SYSTOLIC CONGESTIVE HEART FAILURE (HCC): Chronic | ICD-10-CM

## 2018-03-23 RX ORDER — FUROSEMIDE 20 MG/1
20-40 TABLET ORAL DAILY
Qty: 60 TABLET | Refills: 11 | Status: SHIPPED | OUTPATIENT
Start: 2018-03-23 | End: 2019-03-23

## 2018-08-27 ENCOUNTER — OUTSIDE FACILITY SERVICE (OUTPATIENT)
Dept: CARDIOLOGY | Facility: CLINIC | Age: 83
End: 2018-08-27

## 2018-08-27 PROCEDURE — 99222 1ST HOSP IP/OBS MODERATE 55: CPT | Performed by: NURSE PRACTITIONER

## 2018-08-28 ENCOUNTER — OUTSIDE FACILITY SERVICE (OUTPATIENT)
Dept: CARDIOLOGY | Facility: CLINIC | Age: 83
End: 2018-08-28

## 2018-08-28 PROCEDURE — 99232 SBSQ HOSP IP/OBS MODERATE 35: CPT | Performed by: NURSE PRACTITIONER

## 2018-10-25 ENCOUNTER — TELEPHONE (OUTPATIENT)
Dept: CARDIOLOGY | Facility: CLINIC | Age: 83
End: 2018-10-25